# Patient Record
Sex: MALE | Race: BLACK OR AFRICAN AMERICAN
[De-identification: names, ages, dates, MRNs, and addresses within clinical notes are randomized per-mention and may not be internally consistent; named-entity substitution may affect disease eponyms.]

---

## 2018-05-23 ENCOUNTER — HOSPITAL ENCOUNTER (EMERGENCY)
Dept: HOSPITAL 72 - EMR | Age: 43
Discharge: HOME | End: 2018-05-23
Payer: COMMERCIAL

## 2018-05-23 VITALS — HEIGHT: 69 IN | BODY MASS INDEX: 25.18 KG/M2 | WEIGHT: 170 LBS

## 2018-05-23 VITALS — DIASTOLIC BLOOD PRESSURE: 89 MMHG | SYSTOLIC BLOOD PRESSURE: 140 MMHG

## 2018-05-23 DIAGNOSIS — M25.531: Primary | ICD-10-CM

## 2018-05-23 PROCEDURE — 99283 EMERGENCY DEPT VISIT LOW MDM: CPT

## 2018-05-23 NOTE — EMERGENCY ROOM REPORT
History of Present Illness


General


Chief Complaint:  Upper Extremity Injury


Source:  Patient





Present Illness


HPI


42-year-old male patient presents the ER complaining of right wrist pain.  

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 

fell on his right wrist.  Patient reports pain in right wrist.  Patient denies 

tingling or loss of range of motion.  Patient denies history of acute injury.  

Patient reports right-hand dominant.  Patient has.  Conjunctivae pink shortness 

breath. reports took Motrin a few hours ago.


Allergies:  


Coded Allergies:  


     No Known Allergies (Unverified , 5/23/18)





Patient History


Past Medical History:  see triage record


Reviewed Nursing Documentation:  PMH: Agreed; PSxH: Agreed





Nursing Documentation-PMH


Past Medical History:  No Stated History





Review of Systems


All Other Systems:  negative except mentioned in HPI





Physical Exam





Vital Signs








  Date Time  Temp Pulse Resp B/P (MAP) Pulse Ox O2 Delivery O2 Flow Rate FiO2


 


5/23/18 16:29 98.2 74 18 140/89 96 Room Air  





 98.2       








Sp02 EP Interpretation:  reviewed, normal


General Appearance:  well appearing, no apparent distress, alert, GCS 15, non-

toxic


Head:  normocephalic, atraumatic


Eyes:  bilateral eye normal inspection, bilateral eye PERRL


ENT:  hearing grossly normal, normal pharynx, no angioedema, normal voice, 

uvula midline, moist mucus membranes


Neck:  full range of motion


Respiratory:  lungs clear, normal breath sounds, no rhonchi, no respiratory 

distress, no accessory muscle use, no wheezing, speaking full sentences


Cardiovascular #1:  regular rate, rhythm, no edema


Cardiovascular #2:  2+ radial (R), 2+ radial (L)


Musculoskeletal:  back normal, digits/nails normal, gait/station normal, normal 

range of motion, other - NVI, sensation to light touch, capillary refill less 

than 2 seconds, no snuffbox tenderness, no deformity, patient able to make a 

fist, tender - ulnar aspect of her right wrist


Neurologic:  alert, oriented x3, responsive, motor strength/tone normal, 

sensory intact


Psychiatric:  mood/affect normal


Skin:  no rash





Medical Decision Making


PA Attestation


Dr. Hughes  is my supervising Physician whom patient management has been 

discussed with.


Diagnostic Impression:  


 Primary Impression:  


 Right wrist pain


ER Course


Pt. presents to the ED c/o right wrist pain





Ddx considered but are not limited to fracture, sprain, strain, contusion, 

dislocation.


No erythema, no warmth to touch, no fever, nontoxic appearing, low suspicion 

for septic joint.





Vital signs: are WNL, pt. is afebrile





Ordered X-ray and pain medication.





ER COURSE


on physical exam patient has full range of motion, complains of mild pain with 

radial deviation of right wrist.  No deformity, no snuffbox tenderness.


Provided with pain medication.


An X-ray of the right wrist was ordered, results show no acute fracture, per 

the preliminary reading.





Reports pain symptoms improved.


Splint was applied to the right wrist and was checked afterwards by me showing 

good alignment and support with distal neurovascular functioning intact.





Patient instructed on RICE method: rest, ice, compression, elevation.


Patient instructed to WBAT





completed Workmen's Compensation paperwork, return to modified work, reduced 

work activity of right hand.  


Follow-up with Workmen's Compensation physician for clearance to return to 

activities.


Discuss referral to ortho/pain management/PT as needed.


Discuss further imaging with MRI/CT as needed.





DISCHARGE:


-Rx provided for Ibuprofen for pain symptoms.





At this time pt. is stable for d/c to home. Patient is resting comfortably, in 

no acute distress, nontoxic appearing, talking without difficulty.


Will provide printed patient care instructions, and any necessary prescriptions.


Patient instructed to follow with primary care provider in 3 - 5 days and to 

request further orthopedic follow-up.


Care plan and follow up instructions have been discussed with the patient prior 

to discharge.


Take medications as directed. 


Patient questions asked and answered.


Patient reports understanding and agreement to treatment plan.


ER precautions given, patient instructed to return to ER immediately for any 

new or worsening of symptoms.





- Please note that this Emergency Department Report was dictated using [facility] technology software, occasionally this can lead to 

erroneous entry secondary to interpretation by the dictation equipment.


Other X-Ray Diagnostic Results


Other X-Ray Diagnostic Results :  


   X-Ray ordered:  right wrist


   # of Views/Limited Vs Complete:  3 View


   Indication:  Pain


   EP Interpretation:  Yes


   PA Xray:  Interpretation reviewed, by supervising MD, and agrees with 

findings.


   Interpretation:  no dislocation, no soft tissue swelling, no fractures


   Impression:  No acute disease


   PA Scribe Text


Gabriel Ashley PA-C





Last Vital Signs








  Date Time  Temp Pulse Resp B/P (MAP) Pulse Ox O2 Delivery O2 Flow Rate FiO2


 


5/23/18 16:29 98.2 74 18 140/89 96 Room Air  





 98.2       








Disposition:  HOME, SELF-CARE


Condition:  Stable


Scripts


Ibuprofen* (MOTRIN*) 600 Mg Tablet


600 MG ORAL Q8H PRN for For Pain, #30 TAB 0 Refills


   Prov: Suresh Ashley         5/23/18


Referrals:  


[facility] (PCP)


Patient Instructions:  Wrist Pain, Easy-to-Read





Additional Instructions:  


Patient instructed to follow up with primary care provider and discuss further 

referral to orthopedics.


Patient instructed on RICE method: rest, ice, compression, elevation.


Patient instructed to WBAT.


Take medications as directed. 


Patient questions asked and answered.


ER precautions given, patient instructed to return to ER immediately for any 

new or worsening of symptoms.











Suresh Ashley May 23, 2018 17:08

## 2018-05-23 NOTE — DIAGNOSTIC IMAGING REPORT
Indication: Pain

 

Findings: 3  views of the right wrist were obtained. 

 

No acute fractures, malalignment, erosions or periostitis are identified. Soft

tissues are unremarkable.

 

Impression: No acute findings.